# Patient Record
Sex: MALE | Race: OTHER | Employment: UNEMPLOYED | URBAN - METROPOLITAN AREA
[De-identification: names, ages, dates, MRNs, and addresses within clinical notes are randomized per-mention and may not be internally consistent; named-entity substitution may affect disease eponyms.]

---

## 2023-10-31 NOTE — TELEPHONE ENCOUNTER
ERIC on 11/30/23 with Dr. Josette Coleman @ [address] [place] [address]    H&P- completed 11/01/23  Labs- total bilirubin (1.4), MRSA neg, UA neg, all other labs WNL  EKG- WNL  X-ray- WNL    Dr. Jennifer Vazquez next Wed for elevated Bilirubin  No Clx needed per THE [facility]    Patient was not feeling well on 10/31 cough, fever. Feels better today at appt.

## 2023-11-01 PROBLEM — K51.90 ULCERATIVE COLITIS (HCC): Status: ACTIVE | Noted: 2023-11-01

## 2023-11-01 PROBLEM — E78.00 HIGH CHOLESTEROL: Status: ACTIVE | Noted: 2023-11-01

## 2023-11-01 PROBLEM — M16.12 ARTHRITIS OF LEFT HIP: Status: ACTIVE | Noted: 2023-11-01

## 2023-11-07 NOTE — TELEPHONE ENCOUNTER
Dr. Teira Caputo, please review:    Labs- total bilirubin (1.4), MRSA neg, UA neg, all other labs WNL  EKG- WNL  X-ray- WNL    OK for surgery?

## 2023-11-13 NOTE — TELEPHONE ENCOUNTER
[place] for patient's Daughter Gonzalez Ferrell [phone] with all test results and that he is clear for surgery per Dr. Govind Gibbons.

## 2023-11-29 ENCOUNTER — ANESTHESIA EVENT (OUTPATIENT)
Dept: SURGERY | Facility: HOSPITAL | Age: 58
End: 2023-11-29
Payer: COMMERCIAL

## 2023-11-29 RX ORDER — ACETAMINOPHEN 500 MG
1000 TABLET ORAL EVERY 6 HOURS
OUTPATIENT
Start: 2023-11-29

## 2023-11-29 RX ORDER — KETOROLAC TROMETHAMINE 30 MG/ML
30 INJECTION, SOLUTION INTRAMUSCULAR; INTRAVENOUS EVERY 6 HOURS
OUTPATIENT
Start: 2023-11-29 | End: 2023-12-01

## 2023-11-29 RX ORDER — FAMOTIDINE 10 MG/ML
20 INJECTION, SOLUTION INTRAVENOUS 2 TIMES DAILY
OUTPATIENT
Start: 2023-11-29

## 2023-11-29 RX ORDER — SODIUM CHLORIDE 9 MG/ML
INJECTION, SOLUTION INTRAVENOUS CONTINUOUS
OUTPATIENT
Start: 2023-11-29

## 2023-11-29 RX ORDER — DIPHENHYDRAMINE HYDROCHLORIDE 50 MG/ML
25 INJECTION INTRAMUSCULAR; INTRAVENOUS ONCE AS NEEDED
OUTPATIENT
Start: 2023-11-29 | End: 2023-11-29

## 2023-11-29 RX ORDER — POLYETHYLENE GLYCOL 3350 17 G/17G
17 POWDER, FOR SOLUTION ORAL DAILY PRN
OUTPATIENT
Start: 2023-11-29

## 2023-11-29 RX ORDER — DIPHENHYDRAMINE HCL 25 MG
25 CAPSULE ORAL EVERY 4 HOURS PRN
OUTPATIENT
Start: 2023-11-29

## 2023-11-29 RX ORDER — DIPHENHYDRAMINE HYDROCHLORIDE 50 MG/ML
12.5 INJECTION INTRAMUSCULAR; INTRAVENOUS EVERY 4 HOURS PRN
OUTPATIENT
Start: 2023-11-29

## 2023-11-29 RX ORDER — ASPIRIN 81 MG/1
81 TABLET ORAL 2 TIMES DAILY
OUTPATIENT
Start: 2023-11-29

## 2023-11-29 RX ORDER — ONDANSETRON 2 MG/ML
4 INJECTION INTRAMUSCULAR; INTRAVENOUS EVERY 6 HOURS PRN
OUTPATIENT
Start: 2023-11-29

## 2023-11-29 RX ORDER — FAMOTIDINE 20 MG/1
20 TABLET, FILM COATED ORAL 2 TIMES DAILY
OUTPATIENT
Start: 2023-11-29

## 2023-11-29 RX ORDER — SENNOSIDES 8.6 MG
17.2 TABLET ORAL NIGHTLY
OUTPATIENT
Start: 2023-11-29

## 2023-11-29 RX ORDER — BISACODYL 10 MG
10 SUPPOSITORY, RECTAL RECTAL
OUTPATIENT
Start: 2023-11-29

## 2023-11-29 RX ORDER — HYDROMORPHONE HYDROCHLORIDE 1 MG/ML
0.2 INJECTION, SOLUTION INTRAMUSCULAR; INTRAVENOUS; SUBCUTANEOUS EVERY 2 HOUR PRN
OUTPATIENT
Start: 2023-11-29

## 2023-11-29 RX ORDER — CEFAZOLIN SODIUM/WATER 2 G/20 ML
2 SYRINGE (ML) INTRAVENOUS EVERY 8 HOURS
OUTPATIENT
Start: 2023-11-29 | End: 2023-11-30

## 2023-11-29 RX ORDER — PROCHLORPERAZINE EDISYLATE 5 MG/ML
5 INJECTION INTRAMUSCULAR; INTRAVENOUS EVERY 8 HOURS PRN
OUTPATIENT
Start: 2023-11-29

## 2023-11-29 RX ORDER — HYDROMORPHONE HYDROCHLORIDE 1 MG/ML
0.4 INJECTION, SOLUTION INTRAMUSCULAR; INTRAVENOUS; SUBCUTANEOUS EVERY 2 HOUR PRN
OUTPATIENT
Start: 2023-11-29

## 2023-11-29 RX ORDER — ENEMA 19; 7 G/133ML; G/133ML
1 ENEMA RECTAL ONCE AS NEEDED
OUTPATIENT
Start: 2023-11-29

## 2023-11-29 RX ORDER — DOCUSATE SODIUM 100 MG/1
100 CAPSULE, LIQUID FILLED ORAL 2 TIMES DAILY
OUTPATIENT
Start: 2023-11-29

## 2023-11-30 ENCOUNTER — APPOINTMENT (OUTPATIENT)
Dept: GENERAL RADIOLOGY | Facility: HOSPITAL | Age: 58
End: 2023-11-30
Attending: STUDENT IN AN ORGANIZED HEALTH CARE EDUCATION/TRAINING PROGRAM
Payer: COMMERCIAL

## 2023-11-30 ENCOUNTER — HOSPITAL ENCOUNTER (OUTPATIENT)
Facility: HOSPITAL | Age: 58
Setting detail: HOSPITAL OUTPATIENT SURGERY
Discharge: HOME | End: 2023-11-30
Attending: STUDENT IN AN ORGANIZED HEALTH CARE EDUCATION/TRAINING PROGRAM | Admitting: STUDENT IN AN ORGANIZED HEALTH CARE EDUCATION/TRAINING PROGRAM
Payer: COMMERCIAL

## 2023-11-30 ENCOUNTER — ANESTHESIA (OUTPATIENT)
Dept: SURGERY | Facility: HOSPITAL | Age: 58
End: 2023-11-30
Payer: COMMERCIAL

## 2023-11-30 VITALS
DIASTOLIC BLOOD PRESSURE: 69 MMHG | WEIGHT: 168 LBS | HEIGHT: 65 IN | SYSTOLIC BLOOD PRESSURE: 119 MMHG | OXYGEN SATURATION: 99 % | RESPIRATION RATE: 14 BRPM | BODY MASS INDEX: 27.99 KG/M2 | TEMPERATURE: 97 F | HEART RATE: 57 BPM

## 2023-11-30 PROCEDURE — 97165 OT EVAL LOW COMPLEX 30 MIN: CPT

## 2023-11-30 PROCEDURE — 97530 THERAPEUTIC ACTIVITIES: CPT

## 2023-11-30 PROCEDURE — 97161 PT EVAL LOW COMPLEX 20 MIN: CPT

## 2023-11-30 PROCEDURE — 97116 GAIT TRAINING THERAPY: CPT

## 2023-11-30 PROCEDURE — 88311 DECALCIFY TISSUE: CPT | Performed by: STUDENT IN AN ORGANIZED HEALTH CARE EDUCATION/TRAINING PROGRAM

## 2023-11-30 PROCEDURE — 72170 X-RAY EXAM OF PELVIS: CPT | Performed by: STUDENT IN AN ORGANIZED HEALTH CARE EDUCATION/TRAINING PROGRAM

## 2023-11-30 PROCEDURE — 97535 SELF CARE MNGMENT TRAINING: CPT

## 2023-11-30 PROCEDURE — 0SRB04A REPLACEMENT OF LEFT HIP JOINT WITH CERAMIC ON POLYETHYLENE SYNTHETIC SUBSTITUTE, UNCEMENTED, OPEN APPROACH: ICD-10-PCS | Performed by: STUDENT IN AN ORGANIZED HEALTH CARE EDUCATION/TRAINING PROGRAM

## 2023-11-30 PROCEDURE — 88305 TISSUE EXAM BY PATHOLOGIST: CPT | Performed by: STUDENT IN AN ORGANIZED HEALTH CARE EDUCATION/TRAINING PROGRAM

## 2023-11-30 PROCEDURE — 76000 FLUOROSCOPY <1 HR PHYS/QHP: CPT | Performed by: STUDENT IN AN ORGANIZED HEALTH CARE EDUCATION/TRAINING PROGRAM

## 2023-11-30 DEVICE — FEMORAL HEAD Ø 36 SIZE S
Type: IMPLANTABLE DEVICE | Site: HIP | Status: FUNCTIONAL
Brand: MECTACER BIOLOX DELTA FEMORAL BALL HEAD

## 2023-11-30 DEVICE — SMS COLLARED SOLID STEM LAT #5
Type: IMPLANTABLE DEVICE | Status: FUNCTIONAL
Brand: SMS STEM

## 2023-11-30 DEVICE — ACETABULAR SHELL Ø54 TWO HOLES
Type: IMPLANTABLE DEVICE | Site: HIP | Status: FUNCTIONAL
Brand: MPACT ACETABULAR SYSTEM

## 2023-11-30 DEVICE — HIP REPLACEMENT WITH CERAMIC HEAD: Type: IMPLANTABLE DEVICE | Site: HIP

## 2023-11-30 DEVICE — CANCELLOUS BONE SCREW Ø 6.5 L 20
Type: IMPLANTABLE DEVICE | Site: HIP | Status: FUNCTIONAL
Brand: MPACT EXTENSION

## 2023-11-30 DEVICE — FLAT PE  HC LINER Ø 36 / E
Type: IMPLANTABLE DEVICE | Site: HIP | Status: FUNCTIONAL
Brand: MPACT ACETABULAR SYSTEM

## 2023-11-30 RX ORDER — PHENYLEPHRINE HCL 10 MG/ML
VIAL (ML) INJECTION AS NEEDED
Status: DISCONTINUED | OUTPATIENT
Start: 2023-11-30 | End: 2023-11-30 | Stop reason: SURG

## 2023-11-30 RX ORDER — ACETAMINOPHEN 500 MG
1000 TABLET ORAL ONCE
Status: DISCONTINUED | OUTPATIENT
Start: 2023-11-30 | End: 2023-11-30 | Stop reason: HOSPADM

## 2023-11-30 RX ORDER — CEFAZOLIN SODIUM/WATER 2 G/20 ML
2 SYRINGE (ML) INTRAVENOUS ONCE
Status: COMPLETED | OUTPATIENT
Start: 2023-11-30 | End: 2023-11-30

## 2023-11-30 RX ORDER — LIDOCAINE HYDROCHLORIDE 10 MG/ML
INJECTION, SOLUTION INFILTRATION; PERINEURAL
Status: COMPLETED | OUTPATIENT
Start: 2023-11-30 | End: 2023-11-30

## 2023-11-30 RX ORDER — ACETAMINOPHEN 500 MG
1000 TABLET ORAL ONCE
Status: COMPLETED | OUTPATIENT
Start: 2023-11-30 | End: 2023-11-30

## 2023-11-30 RX ORDER — SODIUM CHLORIDE, SODIUM LACTATE, POTASSIUM CHLORIDE, CALCIUM CHLORIDE 600; 310; 30; 20 MG/100ML; MG/100ML; MG/100ML; MG/100ML
INJECTION, SOLUTION INTRAVENOUS CONTINUOUS
Status: DISCONTINUED | OUTPATIENT
Start: 2023-11-30 | End: 2023-11-30

## 2023-11-30 RX ORDER — CELECOXIB 200 MG/1
400 CAPSULE ORAL ONCE
Status: COMPLETED | OUTPATIENT
Start: 2023-11-30 | End: 2023-11-30

## 2023-11-30 RX ORDER — MORPHINE SULFATE 4 MG/ML
2 INJECTION, SOLUTION INTRAMUSCULAR; INTRAVENOUS EVERY 10 MIN PRN
Status: DISCONTINUED | OUTPATIENT
Start: 2023-11-30 | End: 2023-11-30

## 2023-11-30 RX ORDER — MORPHINE SULFATE 4 MG/ML
4 INJECTION, SOLUTION INTRAMUSCULAR; INTRAVENOUS EVERY 10 MIN PRN
Status: DISCONTINUED | OUTPATIENT
Start: 2023-11-30 | End: 2023-11-30

## 2023-11-30 RX ORDER — GLYCOPYRROLATE 0.2 MG/ML
INJECTION, SOLUTION INTRAMUSCULAR; INTRAVENOUS AS NEEDED
Status: DISCONTINUED | OUTPATIENT
Start: 2023-11-30 | End: 2023-11-30 | Stop reason: SURG

## 2023-11-30 RX ORDER — HYDROMORPHONE HYDROCHLORIDE 1 MG/ML
0.4 INJECTION, SOLUTION INTRAMUSCULAR; INTRAVENOUS; SUBCUTANEOUS EVERY 5 MIN PRN
Status: DISCONTINUED | OUTPATIENT
Start: 2023-11-30 | End: 2023-11-30

## 2023-11-30 RX ORDER — BUPIVACAINE HYDROCHLORIDE 7.5 MG/ML
INJECTION, SOLUTION INTRASPINAL
Status: COMPLETED | OUTPATIENT
Start: 2023-11-30 | End: 2023-11-30

## 2023-11-30 RX ORDER — MORPHINE SULFATE 10 MG/ML
6 INJECTION, SOLUTION INTRAMUSCULAR; INTRAVENOUS EVERY 10 MIN PRN
Status: DISCONTINUED | OUTPATIENT
Start: 2023-11-30 | End: 2023-11-30

## 2023-11-30 RX ORDER — DEXAMETHASONE SODIUM PHOSPHATE 4 MG/ML
VIAL (ML) INJECTION AS NEEDED
Status: DISCONTINUED | OUTPATIENT
Start: 2023-11-30 | End: 2023-11-30 | Stop reason: SURG

## 2023-11-30 RX ORDER — TRANEXAMIC ACID 10 MG/ML
INJECTION, SOLUTION INTRAVENOUS AS NEEDED
Status: DISCONTINUED | OUTPATIENT
Start: 2023-11-30 | End: 2023-11-30 | Stop reason: SURG

## 2023-11-30 RX ORDER — MAGNESIUM SULFATE HEPTAHYDRATE 500 MG/ML
INJECTION, SOLUTION INTRAMUSCULAR; INTRAVENOUS AS NEEDED
Status: DISCONTINUED | OUTPATIENT
Start: 2023-11-30 | End: 2023-11-30 | Stop reason: SURG

## 2023-11-30 RX ORDER — HYDROMORPHONE HYDROCHLORIDE 1 MG/ML
0.6 INJECTION, SOLUTION INTRAMUSCULAR; INTRAVENOUS; SUBCUTANEOUS EVERY 5 MIN PRN
Status: DISCONTINUED | OUTPATIENT
Start: 2023-11-30 | End: 2023-11-30

## 2023-11-30 RX ORDER — KETAMINE HYDROCHLORIDE 50 MG/ML
INJECTION, SOLUTION, CONCENTRATE INTRAMUSCULAR; INTRAVENOUS AS NEEDED
Status: DISCONTINUED | OUTPATIENT
Start: 2023-11-30 | End: 2023-11-30 | Stop reason: SURG

## 2023-11-30 RX ORDER — HYDROMORPHONE HYDROCHLORIDE 1 MG/ML
0.2 INJECTION, SOLUTION INTRAMUSCULAR; INTRAVENOUS; SUBCUTANEOUS EVERY 5 MIN PRN
Status: DISCONTINUED | OUTPATIENT
Start: 2023-11-30 | End: 2023-11-30

## 2023-11-30 RX ORDER — OXYCODONE HYDROCHLORIDE 5 MG/1
2.5 TABLET ORAL EVERY 4 HOURS PRN
Status: DISCONTINUED | OUTPATIENT
Start: 2023-11-30 | End: 2023-11-30

## 2023-11-30 RX ORDER — NALOXONE HYDROCHLORIDE 0.4 MG/ML
80 INJECTION, SOLUTION INTRAMUSCULAR; INTRAVENOUS; SUBCUTANEOUS AS NEEDED
Status: DISCONTINUED | OUTPATIENT
Start: 2023-11-30 | End: 2023-11-30

## 2023-11-30 RX ORDER — ONDANSETRON 2 MG/ML
INJECTION INTRAMUSCULAR; INTRAVENOUS AS NEEDED
Status: DISCONTINUED | OUTPATIENT
Start: 2023-11-30 | End: 2023-11-30 | Stop reason: SURG

## 2023-11-30 RX ORDER — OXYCODONE HYDROCHLORIDE 5 MG/1
5 TABLET ORAL EVERY 4 HOURS PRN
Status: DISCONTINUED | OUTPATIENT
Start: 2023-11-30 | End: 2023-11-30

## 2023-11-30 RX ADMIN — SODIUM CHLORIDE, SODIUM LACTATE, POTASSIUM CHLORIDE, CALCIUM CHLORIDE: 600; 310; 30; 20 INJECTION, SOLUTION INTRAVENOUS at 08:26:00

## 2023-11-30 RX ADMIN — PHENYLEPHRINE HCL 100 MCG: 10 MG/ML VIAL (ML) INJECTION at 08:23:00

## 2023-11-30 RX ADMIN — SODIUM CHLORIDE, SODIUM LACTATE, POTASSIUM CHLORIDE, CALCIUM CHLORIDE: 600; 310; 30; 20 INJECTION, SOLUTION INTRAVENOUS at 08:58:00

## 2023-11-30 RX ADMIN — SODIUM CHLORIDE, SODIUM LACTATE, POTASSIUM CHLORIDE, CALCIUM CHLORIDE: 600; 310; 30; 20 INJECTION, SOLUTION INTRAVENOUS at 09:03:00

## 2023-11-30 RX ADMIN — KETAMINE HYDROCHLORIDE 50 MG: 50 INJECTION, SOLUTION, CONCENTRATE INTRAMUSCULAR; INTRAVENOUS at 08:31:00

## 2023-11-30 RX ADMIN — SODIUM CHLORIDE, SODIUM LACTATE, POTASSIUM CHLORIDE, CALCIUM CHLORIDE: 600; 310; 30; 20 INJECTION, SOLUTION INTRAVENOUS at 08:09:00

## 2023-11-30 RX ADMIN — SODIUM CHLORIDE, SODIUM LACTATE, POTASSIUM CHLORIDE, CALCIUM CHLORIDE: 600; 310; 30; 20 INJECTION, SOLUTION INTRAVENOUS at 07:24:00

## 2023-11-30 RX ADMIN — MAGNESIUM SULFATE HEPTAHYDRATE 1 G: 500 INJECTION, SOLUTION INTRAMUSCULAR; INTRAVENOUS at 09:00:00

## 2023-11-30 RX ADMIN — PHENYLEPHRINE HCL 100 MCG: 10 MG/ML VIAL (ML) INJECTION at 08:09:00

## 2023-11-30 RX ADMIN — PHENYLEPHRINE HCL 100 MCG: 10 MG/ML VIAL (ML) INJECTION at 08:02:00

## 2023-11-30 RX ADMIN — PHENYLEPHRINE HCL 100 MCG: 10 MG/ML VIAL (ML) INJECTION at 08:16:00

## 2023-11-30 RX ADMIN — BUPIVACAINE HYDROCHLORIDE 1.5 ML: 7.5 INJECTION, SOLUTION INTRASPINAL at 07:35:00

## 2023-11-30 RX ADMIN — TRANEXAMIC ACID 1000 MG: 10 INJECTION, SOLUTION INTRAVENOUS at 07:39:00

## 2023-11-30 RX ADMIN — PHENYLEPHRINE HCL 100 MCG: 10 MG/ML VIAL (ML) INJECTION at 08:57:00

## 2023-11-30 RX ADMIN — SODIUM CHLORIDE, SODIUM LACTATE, POTASSIUM CHLORIDE, CALCIUM CHLORIDE: 600; 310; 30; 20 INJECTION, SOLUTION INTRAVENOUS at 08:15:00

## 2023-11-30 RX ADMIN — SODIUM CHLORIDE, SODIUM LACTATE, POTASSIUM CHLORIDE, CALCIUM CHLORIDE: 600; 310; 30; 20 INJECTION, SOLUTION INTRAVENOUS at 08:02:00

## 2023-11-30 RX ADMIN — CEFAZOLIN SODIUM/WATER 2 G: 2 G/20 ML SYRINGE (ML) INTRAVENOUS at 07:46:00

## 2023-11-30 RX ADMIN — ONDANSETRON 4 MG: 2 INJECTION INTRAMUSCULAR; INTRAVENOUS at 07:27:00

## 2023-11-30 RX ADMIN — PHENYLEPHRINE HCL 100 MCG: 10 MG/ML VIAL (ML) INJECTION at 08:34:00

## 2023-11-30 RX ADMIN — DEXAMETHASONE SODIUM PHOSPHATE 8 MG: 4 MG/ML VIAL (ML) INJECTION at 07:38:00

## 2023-11-30 RX ADMIN — LIDOCAINE HYDROCHLORIDE 3 ML: 10 INJECTION, SOLUTION INFILTRATION; PERINEURAL at 07:31:00

## 2023-11-30 RX ADMIN — GLYCOPYRROLATE 0.2 MG: 0.2 INJECTION, SOLUTION INTRAMUSCULAR; INTRAVENOUS at 08:28:00

## 2023-11-30 RX ADMIN — TRANEXAMIC ACID 1000 MG: 10 INJECTION, SOLUTION INTRAVENOUS at 09:00:00

## 2023-11-30 NOTE — OPERATIVE REPORT
[facility] at [address]: 11/30/2023    PREOPERATIVE DIAGNOSIS:   Left Hip Osteoarthritis    POST-OPERATIVE DIAGNOSIS:   Left Hip Osteoarthritis    PROCEDURE:  Left Total Hip Arthroplasty  Intra-operative Interpretation of Fluoroscopy    Location: [address] [place] [address] Ave: Sammy Keith MD  Assistant(s): Sheng Camarena MD    Anesthesia type: Spinal  Estimated Blood Loss: 300cc    Drains: None    Findings: Consistent with advanced degenerative joint disease    Specimen: Femoral head    Complications: None immediately apparent    Implants:  Acetabular Component: Medacta, size 54mm Mpact shell, 36mm Neutral Polyethylene Liner, One 6.5mm dome screw  Femoral Component: Press-fit size 5, high offset, Medacta SMS femoral component  Head: 36mm -4 delta ceramic head. Indication: This is a 59year old man, who presented with chronic hip pain refractory to non-operative treatment, and impairing activities of daily living. Radiographic evaluation demonstrated hip osteoarthritis . Surgical versus non-surgical options were discussed with the patient, and together we decided it is best to proceed with a total hip arthroplasty with the goals of pain relief and improvement in function. All risks and benefits of surgery including bleeding, infection, dislocation, monique-prosthetic fracture, neurovascular injury, leg length or offset discrepancy, as well as medical and anesthesia-related complications were discussed with the patient / family, who elected to proceed with surgery. Procedure in detail:    The patient was brought to the operating room, and underwent the above anesthesia. The patient was placed in a supine position with both feet secured in boots on the [place] table. The operative hip was sterilely prepped and draped in a usual orthopedic fashion. A surgical pause was conducted to reconfirm the correct patient, site, side, and procedure to be performed. Perioperative antibiotics were given within one hour prior to the procedure. An approximately 7cm long incision was made beginning proximally 2 cm lateral and 2 cm distal to the anterior-superior iliac spine and extending distally  toward the ipsilateral lateral epicondyle of the knee. Electrocautery was used to dissect through the subcutaneous tissue. The fascia overlying the TFL was incised in line with its fibers. A Jacobs elevator was used to separate the fascia from the TFL. A finger then was placed along the superior femoral neck, and a cobra retractor was placed. A cerebellar retractor was placed distally between the rectus and the tensor muscle. Using electrocautery and a Schnidt tonsil clamp, the ascending branches of the lateral circumflex artery were ligated. The investing fascia over the capsule was divided, and another cobra retractor was placed inferior to the femoral neck. The pericapsular fat was then excised from around the anterior hip capsule. The anterior hip capsule was then incised with electrocautery starting at the edge of the acetabulum in line with the anterosuperior edge of the femoral neck and extending distally to the anterior intertrochanteric line, dividing the capsule at about a 135-degree angle. The medial and lateral capsule flaps were elevated off the of the proximal femur intertrochanteric line, and No. 5 Ethibond tagging sutures were placed in the both flaps of the capsule. A Hohmann retractor was placed posterosuperiorly over the acetabulum and the capsule was elevated a few millimeters off of the acetabular rim and ilium. The hip was externally rotated to 90 degrees, allowing an inferomedial split of the capsule. The hip was rotated back and a double pronged retractor was placed inferomedially instead of the cobra. The superior cobra was placed inside the capsule. A magy was made on the femoral neck in line with the planned femoral neck resection.   A reciprocating saw was used to complete the femoral neck osteotomy. The hip was externally rotated to 70 degrees, and traction was applied. A spiral femoral hip screw was placed in the femoral head, and the femoral head was removed from the wound. A No. 1 retractor over the anterior wall of the acetabulum and the No. 2 retractor was placed postero-superiorly. This exposed the acetabulum. We then used electrocautery to excise the labrum and pulvinar. Fluoroscopy was utilized to obtain a perfect AP pelvis radiograph. We then started with a 51-mm reamer, and sequentially expanded the socket. The last reamer was a 54-mm reamer. Once appropriate reaming and positioning of the cup was determined with fluoroscopy, we then placed a final 54 mm acetabular shell in the appropriate degree of anteversion and abduction. There was an excellent press fit. Supplemental 6.5mm cancellous bone screw  was drilled, measured and placed. The final  36 mm neutral polyethylene acetabular liner was impacted into place without difficulty. The liner was checked and noted to be well seated. Traction was released, and then we turned our attention to the femur. The femoral hook from the [facility] 19 bed was placed around the vastus ridge. The leg was externally rotated 140 degrees. An asymmetric double pronged retractor was placed distally medially over the calcar, and the leg was then extended and adducted. A #1 retractor was placed posteriorly between the superolateral capsule and the gluteus minimus. The lateral capsule was then released off the femur, allowing for improved elevation of the femur. The conjoined was released but the obutrator externus and piriformis tendons were not released. The hook was elevated, a double pronged retractor was placed over the greater trochanter and appropriate exposure of the femur was achieved. A box osteotome was used to identify the starting location.  A rongeur was used to remove posterolateral cortical bone to allow neutral broaching. The femoral canal was then broached sequentially to a size 5 stem. The broach was left in place, and a high offset neck with a 36-mm -4 trial head were placed. The leg was abducted and raised, and the hip was then reduced. Fluoroscopic imaging demonstrated appropriate leg length and offset. The hip was found to have appropriate soft tissue tension. Hip range of motion was tested and noted to be stable throughout. We then elected to implant these components. The hip was re-dislocated. The trial components were removed. The femur was exposed again, and the final size 5, high offset stem was impacted into place. The calcar was checked and noted to be intact without any evidence of fracture. A 36 mm -4 femoral head was retrialed with appropriate soft tissue tension. The final 36 mm  -4  femoral head was then impacted onto a cleaned/dried trunnion. The hip was then reduced. The wound was thoroughly irrigated with dilute betadine solution followed by normal saline. Pain cocktail injection was delivered to the soft tissues. The capsule was closed with No. 1 Vicryl interrupted sutures. The fascia over the TFL was closed with a running Quill suture. The subcutaneous tissues were then closed with 2-0 vicryl. The skin was closed with a running 3-0 monocryl suture. Dermabond was applied to the skin edges and a dry sterile dressing was placed. The patient was awakened from anesthesia and taken to the PACU in stable condition. There were no immediate complications. POST-OPERATIVE PLAN  The patient will be permitted weight bearing as tolerated on the operative extremity  The patient will be permitted range of motion as tolerated  The patient will receive 24 hours of perioperative antibiotics. Venous thromboembolic prophylaxis will consist of early mobilization, mechanical compressive devices, and ASA.     The dressing may be removed at 12 days post-operatively  The patient should be scheduled for follow up in 2 weeks for wound and radiographic evaluation.

## 2023-11-30 NOTE — PHYSICAL THERAPY NOTE
PHYSICAL THERAPY EVALUATION - INPATIENT     [facility]: [facility]  Evaluation Date: 11/30/2023  Type of Evaluation: Initial   Physician Order: PT Eval and Treat    Presenting Problem: s/p left CONOR 11/30/23     Reason for Therapy: Mobility Dysfunction and Discharge Planning    PHYSICAL THERAPY ASSESSMENT     Patient is a 59year old male admitted 11/30/2023 for elective left CONOR. Pt ok to see per rn. Pt recd in supine, spouse and daughter present. Pt educated in role of PT, ankle pumps for DVT prevention,  goals for session. Pt able to perform ankle pumps, reports return of sensation left LE, able to perform left quad set and SAQ with good quality. Patient instructed in and performed therex per CONOR protocol, tolerated well, issued written HEP. Pt transferred supine to sit without assist, good sitting balance, no dizziness. Pt provided verbal instruction and demonstration of rw use, stood to rw with no assist.  Pt able to perform left tke, marching in place with no left knee buckling, instructed in gait sequencing. Pt amb with rw with gait training emphasis on heel toe pattern, upright posture. Pt tolerated well with no LOB, able to advance to step through gait pattern. Pt also instructed in stair ambulation, ascended/descended 4 stairs with step to gait pattern and use of railing, good tolerance with demonstration of good left quad control. Pt returned to bs chair,  Pt and spouse educated about POC., discussed dc recommendations. Discussed session with rn,  assist recommendations. THERAPEUTIC EXERCISES  Lower Extremity Ankle pumps  Hip AB/AD  LAQ  Quad sets     Position Sitting and Supine         The patient's Approx Degree of Impairment: 11.2% has been calculated based on documentation in the [facility] '6 clicks' Inpatient Basic Mobility Short Form.   Research supports that patients with this level of impairment may benefit from home with supportive family and home PT.    DISCHARGE RECOMMENDATIONS  PT Discharge Recommendations: Home with home health PT; Intermittent Supervision    PLAN  Patient has been evaluated and presents with no skilled Physical Therapy needs at this time. Patient will be discharged from Physical Therapy services. Please re-order if a new functional limitation presents during this admission. PHYSICAL THERAPY MEDICAL/SOCIAL HISTORY        Problem List  Active Problems:    * No active hospital problems. *      HOME SITUATION  Home Situation  Type of Home: House  Stairs to Enter : 1  Stairs to [employer]: 5  Railing: Yes  Lives With: Spouse (2 twin sons)  Drives: Yes  Patient Owned Equipment: Rolling walker  Patient Regularly Uses: Reading glasses     Prior Level of Glenn: Pt reports ind pta, did not use assistive device. Pt lives with supportive spouse who will be able to assist as needed upon edw dc. SUBJECTIVE  \"I have no pain\"    PHYSICAL THERAPY EXAMINATION     OBJECTIVE  Precautions:  (no hip precautions per Dr Rey Ramirez)  Fall Risk: Standard fall risk    WEIGHT BEARING RESTRICTION           L Lower Extremity: Weight Bearing as Tolerated    PAIN ASSESSMENT  Ratin  Location: denies  Management Techniques: Activity promotion    COGNITION  Overall Cognitive Status:  WFL - within functional limits    RANGE OF MOTION AND STRENGTH ASSESSMENT  Upper extremity ROM and strength are within functional limits   Lower extremity ROM is within functional limits except left hip  Lower extremity strength is within functional limits     BALANCE  Static Sitting: Good  Dynamic Sitting: Good  Static Standing: Good  Dynamic Standing: Good      O2 WALK  Oxygen Therapy  SPO2% on Room Air at Rest: 049    AM-PAC '6-Clicks' INPATIENT SHORT FORM - BASIC MOBILITY  How much difficulty does the patient currently have. ..   Patient Difficulty: Turning over in bed (including adjusting bedclothes, sheets and blankets)?: None   Patient Difficulty: Sitting down on and standing up from a chair with arms (e.g., wheelchair, bedside commode, etc.): None   Patient Difficulty: Moving from lying on back to sitting on the side of the bed?: None   How much help from another person does the patient currently need. .. Help from Another: Moving to and from a bed to a chair (including a wheelchair)?: None   Help from Another: Need to walk in hospital room?: None   Help from Another: Climbing 3-5 steps with a railing?: A Little     AM-PAC Score:  Raw Score: 23   Approx Degree of Impairment: 11.2%   Standardized Score (AM-PAC Scale): 56.93   CMS Modifier (G-Code): CI    FUNCTIONAL ABILITY STATUS  Functional Mobility/Gait Assessment  Gait Assistance: Modified independent  Distance (ft): 150  Assistive Device: Rolling walker  Pattern: Within Functional Limits  Stairs: Stairs  How Many Stairs: 4  Device: 2 Rails  Assist: Supervision      Exercise/Education Provided:  Bed mobility  Energy conservation  Functional activity tolerated  Gait training  Posture  ROM  Strengthening  Transfer training    Patient End of Session: In bed;Needs met;Call light within reach;RN aware of session/findings; All patient questions and concerns addressed; Family present    Patient was able to achieve the following . ..    Patient able to transfer  Safely and independently    Patient able to ambulate on level surfaces  Safely and independently   Patient Evaluation Complexity Level:  History Low - no personal factors and/or co-morbidities   Examination of body systems Low - addressing 1-2 elements   Clinical Presentation Low - Stable   Clinical Decision Making Low Complexity       Gait Training: 15 minutes  Therapeutic Activity: 15 minutes

## 2023-11-30 NOTE — INTERVAL H&P NOTE
Pre-op Diagnosis: Left hip osteoarthritis    The above referenced H&P was reviewed by Leann Crews MD on 11/30/2023, the patient was examined and no significant changes have occurred in the patient's condition since the H&P was performed. I discussed with the patient and/or legal representative the potential benefits, risks and side effects of this procedure; the likelihood of the patient achieving goals; and potential problems that might occur during recuperation. I discussed reasonable alternatives to the procedure, including risks, benefits and side effects related to the alternatives and risks related to not receiving this procedure. We will proceed with procedure as planned.

## 2023-11-30 NOTE — ANESTHESIA PROCEDURE NOTES
Spinal Block    Date/Time: 11/30/2023 7:27 AM    Performed by: Hilary Carreon CRNA  Authorized by: Tonita Seip, MD      General Information and Staff    Start Time:  11/30/2023 7:27 AM  End Time:  11/30/2023 7:35 AM  Anesthesiologist:  Tonita Seip, MD  CRNA:  Hilary Carreon CRNA  Performed by:  CRNA  Patient Location:  OR  Site identification: surface landmarks    Reason for Block: surgical anesthesia    Preanesthetic Checklist: patient identified, IV checked, risks and benefits discussed, monitors and equipment checked, pre-op evaluation, timeout performed, anesthesia consent and sterile technique used      Procedure Details    Patient Position:  Sitting  Prep: ChloraPrep and patient draped    Prep comment:  3 minute dry time. Monitoring:  Continuous pulse ox, cardiac monitor and heart rate  Approach:  Midline  Location:  L3-4  Injection Technique:  Single-shot    Needle    Needle Type:  Pencil-tip  Needle Gauge:  24 G  Needle Length:  4 in    Assessment    Sensory Level:  T10  Events: clear CSF, CSF aspirated, well tolerated and blood negative      Additional Comments     Noted what appears to be upright oval fat pad over just lumbar spine. Patient confirms never had infection, injury, or other back problem. Based on pt history and site assessment pathology less likely. Consulted with Dr. Sara Sr who confirms ok to proceed. Uneventful procedure.

## 2023-11-30 NOTE — ANESTHESIA PROCEDURE NOTES
Airway  Date/Time: 11/30/2023 7:51 AM  Urgency: Elective      General Information and Staff    Patient location during procedure: OR  Anesthesiologist: Tonita Seip, MD  Resident/CRNA: Hilary Carreon CRNA  Performed: CRNA   Performed by: Hilary Carreon CRNA  Authorized by: Tonita Seip, MD      Indications and Patient Condition  Indications for airway management: anesthesia  Sedation level: deep  Preoxygenated: yes  Patient position: sniffing  Mask difficulty assessment: 2 - vent by mask + OA or adjuvant +/- NMBA    Final Airway Details  Final airway type: supraglottic airway      Successful airway: classic  Size 4       Number of attempts at approach: 1    Additional Comments  Atraumatic insertion, dentition and soft structures as preop. Soft bite block inserted between left molars, tongue midline.

## (undated) DEVICE — SOLUTION IRRIG 3000ML 0.9% NACL FLX CONT

## (undated) DEVICE — HOOD, PEEL-AWAY: Brand: FLYTE

## (undated) DEVICE — SUTURE MCRYL SZ 2-0 L36IN ABSRB UD L36MM CT-1

## (undated) DEVICE — HOOD: Brand: FLYTE

## (undated) DEVICE — GAMMEX® NON-LATEX PI ORTHO SIZE 8.5, STERILE POLYISOPRENE POWDER-FREE SURGICAL GLOVE: Brand: GAMMEX

## (undated) DEVICE — HANDPIECE SET WITH HIGH FLOW TIP AND SUCTION TUBE: Brand: INTERPULSE

## (undated) DEVICE — CANCELLOUS BONE SCREW Ø 6.5 L 25
Type: IMPLANTABLE DEVICE | Site: HIP | Status: NON-FUNCTIONAL
Brand: MPACT EXTENSION
Removed: 2023-11-30

## (undated) DEVICE — SUTURE ETHBND EXCEL SZ 2 L30IN NONABSORBABLE MX69G

## (undated) DEVICE — GAMMEX® PI HYBRID SIZE 8, STERILE POWDER-FREE SURGICAL GLOVE, POLYISOPRENE AND NEOPRENE BLEND: Brand: GAMMEX

## (undated) DEVICE — SOLUTION IRRIG 1000ML 0.9% NACL USP BTL

## (undated) DEVICE — DRAPE SHEET LG

## (undated) DEVICE — NEEDLE SPNL 18GA L3.5IN PNK HUB SS RW FIT

## (undated) DEVICE — SUTURE VCRL SZ 2-0 L27IN ABSRB UD L26MM CT-2

## (undated) DEVICE — DRESSING SURG W9XL25CM SIL SP CVR WTRPRF VIR

## (undated) DEVICE — SYRINGE CATH TIP 50ML TIP SHLD DISP

## (undated) DEVICE — 2T11 #2 PDO 36 X 36: Brand: 2T11 #2 PDO 36 X 36

## (undated) DEVICE — SYRINGE MNJCT 35ML LF STRL LL

## (undated) DEVICE — 1016 S-DRAPE IRRIG POUCH 10/BOX: Brand: STERI-DRAPE™

## (undated) DEVICE — ADHESIVE SKIN TOP FOR WND CLSR DERMBND ADV

## (undated) DEVICE — INTENDED FOR TISSUE SEPARATION, AND OTHER PROCEDURES THAT REQUIRE A SHARP SURGICAL BLADE TO PUNCTURE OR CUT.: Brand: BARD-PARKER ® STAINLESS STEEL BLADES

## (undated) DEVICE — UNDYED BRAIDED (POLYGLACTIN 910), SYNTHETIC ABSORBABLE SUTURE: Brand: COATED VICRYL

## (undated) DEVICE — VIOLET BRAIDED (POLYGLACTIN 910), SYNTHETIC ABSORBABLE SUTURE: Brand: COATED VICRYL

## (undated) DEVICE — APPLICATOR SKIN PREP 26ML HI LT ORNG 2% CHG

## (undated) DEVICE — SUTURE MCRYL SZ 3-0 L27IN ABSRB UD L24MM PS-1

## (undated) DEVICE — WOUND RETRACTOR AND PROTECTOR: Brand: ALEXIS O WOUND PROTECTOR-RETRACTOR

## (undated) DEVICE — SYSTEM GRIPPER SELF RETRACTING

## (undated) DEVICE — SOLUTION PREP 26ML 0.7% POVACRYLEX 74% ISO

## (undated) DEVICE — ANTERIOR HIP: Brand: MEDLINE INDUSTRIES, INC.

## (undated) DEVICE — DRAPE SRG U 124X80IN U REINF

## (undated) DEVICE — Device: Brand: STABLECUT®

## (undated) DEVICE — DRAPE PACK CHEST